# Patient Record
Sex: FEMALE | Race: WHITE | URBAN - METROPOLITAN AREA
[De-identification: names, ages, dates, MRNs, and addresses within clinical notes are randomized per-mention and may not be internally consistent; named-entity substitution may affect disease eponyms.]

---

## 2023-07-03 ENCOUNTER — OFFICE VISIT (OUTPATIENT)
Dept: URBAN - METROPOLITAN AREA CLINIC 58 | Facility: CLINIC | Age: 28
End: 2023-07-03
Payer: COMMERCIAL

## 2023-07-03 DIAGNOSIS — H52.13 MYOPIA, BILATERAL: ICD-10-CM

## 2023-07-03 DIAGNOSIS — M06.9 RHEUMATOID ARTHRITIS, UNSPECIFIED: Primary | ICD-10-CM

## 2023-07-03 DIAGNOSIS — R51.9 HEADACHE, UNSPECIFIED: ICD-10-CM

## 2023-07-03 DIAGNOSIS — Z79.899 OTHER LONG TERM (CURRENT) DRUG THERAPY: ICD-10-CM

## 2023-07-03 PROCEDURE — 92004 COMPRE OPH EXAM NEW PT 1/>: CPT | Performed by: STUDENT IN AN ORGANIZED HEALTH CARE EDUCATION/TRAINING PROGRAM

## 2023-07-03 PROCEDURE — 92133 CPTRZD OPH DX IMG PST SGM ON: CPT | Performed by: STUDENT IN AN ORGANIZED HEALTH CARE EDUCATION/TRAINING PROGRAM

## 2023-07-03 ASSESSMENT — INTRAOCULAR PRESSURE
OD: 13
OS: 15

## 2023-07-03 ASSESSMENT — VISUAL ACUITY
OS: 20/20
OD: 20/20

## 2023-07-03 NOTE — IMPRESSION/PLAN
Impression: Other long term (current) drug therapy: Z79.899. Plan: Counseled patient regarding findings today. No evidence of bulls eye changes on HVF 10-2 or any evidence of outer retinal atrophy on macular OCT. Will continue to monitor at this time. No bulls eye maculopathy noted on DFE OU. Note to be sent to patient's PCP detailing findings. Orderd VF 10-2 VF 30-2, OCT Mac.
 
--patient is taking for : Sjogren's disease/lupus/arthritis --started Plaquenil years ago -- patient is taking mg 230
--Patient weighs: 
--Rheumatologist/PCP is:

## 2023-07-03 NOTE — IMPRESSION/PLAN
Impression: Headache, unspecified: R51.9. Plan: Patient educated on findings. Patient educated that headache may be related to mild glasses Rx. Recommend glasses for full time wear, if still experiencing headaches in 3-4 weeks, recommend that patient follow up with PCP to ensure no systemic cause of headaches.

## 2023-07-03 NOTE — IMPRESSION/PLAN
Impression: Rheumatoid arthritis, unspecified: M06.9. Plan: Discussed diagnosis in detail with patient.  Continue under care of rhumetologist.

## 2023-07-11 ENCOUNTER — TESTING ONLY (OUTPATIENT)
Dept: URBAN - METROPOLITAN AREA CLINIC 58 | Facility: CLINIC | Age: 28
End: 2023-07-11
Payer: COMMERCIAL

## 2023-07-12 ENCOUNTER — TESTING ONLY (OUTPATIENT)
Dept: URBAN - METROPOLITAN AREA CLINIC 58 | Facility: CLINIC | Age: 28
End: 2023-07-12
Payer: COMMERCIAL

## 2023-07-12 DIAGNOSIS — Z79.899 OTHER LONG TERM (CURRENT) DRUG THERAPY: Primary | ICD-10-CM
